# Patient Record
Sex: FEMALE | Race: WHITE | URBAN - METROPOLITAN AREA
[De-identification: names, ages, dates, MRNs, and addresses within clinical notes are randomized per-mention and may not be internally consistent; named-entity substitution may affect disease eponyms.]

---

## 2017-08-08 ENCOUNTER — HISTORICAL (OUTPATIENT)
Dept: LAB | Facility: HOSPITAL | Age: 82
End: 2017-08-08

## 2017-08-09 LAB — GRAM STN SPEC: NORMAL

## 2017-08-11 LAB
FINAL CULTURE: NO GROWTH
FINAL CULTURE: NORMAL

## 2018-04-13 ENCOUNTER — HISTORICAL (OUTPATIENT)
Dept: ENDOSCOPY | Facility: HOSPITAL | Age: 83
End: 2018-04-13

## 2022-04-30 NOTE — H&P
Patient:   Brigitte Centeno            MRN: [id number]            FIN: [id number]               Age:   88 years     Sex:  Female     :  1929   Associated Diagnoses:   None   Author:   Luke MARTIN, Rafael WATKINS      88 year old woman with anemia, htn, rheumatoid arthritis, afib on coumadin here for EGD for chronic nausea and vomiting. She states this has been going on for a few years.   She denies dysphagia.  This seems to occur after eating.  She states she has had a gallbladder surgery in the past.       Review of Systems   Constitutional:  Negative except as documented in history of present illness.    Eye:  Negative except as documented in history of present illness.    Ear/Nose/Mouth/Throat:  Negative except as documented in history of present illness.       Health Status   Allergies:    Allergic Reactions (Selected)  Severity Not Documented  Penicillin- Unknown.   Current medications:  (Selected)   Inpatient Medications  Ordered  Buffered Lidocaine 1% - 1mL syringe: 0.5 mL, 5 mg =, form: Injection, ID, As Directed PRN for other (see comment), first dose 18 8:17:00 CDT, May inject 0.5mL at IV site, if not allergic  Plasmalyte 1,000 mL: 1,000 mL, 1,000 mL, IV, 20 mL/hr, start date 18 8:17:00 CDT  Zofran: 4 mg, form: Injection, IV Push, On Call, first dose 18 8:17:00 CDT, (give if Reglan contraindicated or patient has history of post anesthesia nausea/vomiting)  midazolam: 1 mg, form: Injection, IV Push, q5min PRN for anxiety, Order duration: 2 dose(s), first dose 18 8:17:00 CDT, stop date Limited # of times, may repeat x1 in 5 minutes if anxiety not relieved.  Hold  if pregnancy test is pending.  (ADULT PATIENTS)...      Histories   Past Medical History:    No active or resolved past medical history items have been selected or recorded.   Procedure history:    Hip replacement (9939671740).  Knee replacement ([id number]).  Heart valve replacement ([id number]).      Physical  Examination      No qualifying data available   General:  Alert and oriented, No acute distress.    Eye:  Pupils are equal, round and reactive to light.    HENT:  Oral mucosa is moist.    Neck:  Supple.    Respiratory:  Respirations are non-labored, Symmetrical chest wall expansion.    Cardiovascular:  S1, S2.    Gastrointestinal:  Soft, Non-tender, Non-distended, Normal bowel sounds.       Review / Management   Results review:     No qualifying data available.       Impression and Plan   1. Nausea and Vomiting----plan for EGD.